# Patient Record
Sex: MALE | URBAN - METROPOLITAN AREA
[De-identification: names, ages, dates, MRNs, and addresses within clinical notes are randomized per-mention and may not be internally consistent; named-entity substitution may affect disease eponyms.]

---

## 2021-08-09 ENCOUNTER — APPOINTMENT (OUTPATIENT)
Dept: URBAN - METROPOLITAN AREA SURGERY 18 | Age: 56
Setting detail: DERMATOLOGY
End: 2021-08-10

## 2021-08-09 VITALS
SYSTOLIC BLOOD PRESSURE: 148 MMHG | RESPIRATION RATE: 17 BRPM | DIASTOLIC BLOOD PRESSURE: 85 MMHG | HEART RATE: 65 BPM | TEMPERATURE: 97.5 F

## 2021-08-09 PROBLEM — D04.71 CARCINOMA IN SITU OF SKIN OF RIGHT LOWER LIMB, INCLUDING HIP: Status: ACTIVE | Noted: 2021-08-09

## 2021-08-09 PROCEDURE — OTHER COUNSELING: OTHER

## 2021-08-09 PROCEDURE — 17313 MOHS 1 STAGE T/A/L: CPT

## 2021-08-09 PROCEDURE — OTHER MOHS SURGERY: OTHER

## 2021-08-09 PROCEDURE — OTHER REFERRAL CORRESPONDENCE: OTHER

## 2021-08-31 ENCOUNTER — APPOINTMENT (OUTPATIENT)
Dept: URBAN - METROPOLITAN AREA SURGERY 18 | Age: 56
Setting detail: DERMATOLOGY
End: 2021-09-01

## 2021-08-31 VITALS — TEMPERATURE: 98.4 F

## 2021-08-31 DIAGNOSIS — Z48.817 ENCOUNTER FOR SURGICAL AFTERCARE FOLLOWING SURGERY ON THE SKIN AND SUBCUTANEOUS TISSUE: ICD-10-CM

## 2021-08-31 PROCEDURE — OTHER POST-OP WOUND CHECK: OTHER

## 2021-08-31 PROCEDURE — 99024 POSTOP FOLLOW-UP VISIT: CPT

## 2021-08-31 ASSESSMENT — LOCATION ZONE DERM: LOCATION ZONE: LEG

## 2021-08-31 ASSESSMENT — LOCATION DETAILED DESCRIPTION DERM: LOCATION DETAILED: RIGHT MEDIAL DISTAL PRETIBIAL REGION

## 2021-08-31 ASSESSMENT — LOCATION SIMPLE DESCRIPTION DERM: LOCATION SIMPLE: RIGHT PRETIBIAL REGION

## 2021-08-31 NOTE — PROCEDURE: POST-OP WOUND CHECK
Add 93937 Cpt? (Important Note: In 2017 The Use Of 18997 Is Being Tracked By Cms To Determine Future Global Period Reimbursement For Global Periods): yes
Additional Comments: Appropriately healing surgical site; clean with areas of granulating tissue. Patient instructed to stop vinegar soaks and continue daily dressing changes allowing site to dry at least 10 to 15 minutes prior to bandaging. Site redressed with petroleum, telfa, and tape. Patient prefers to follow up only if needed (if new onset symptoms/concerns develop at surgical site, or if fails to fully heal by 12 weeks postop).
Wound Evaluated By: Dr. Ziyad Alvarado
Detail Level: Detailed
